# Patient Record
Sex: MALE | Race: WHITE
[De-identification: names, ages, dates, MRNs, and addresses within clinical notes are randomized per-mention and may not be internally consistent; named-entity substitution may affect disease eponyms.]

---

## 2018-05-01 ENCOUNTER — HOSPITAL ENCOUNTER (EMERGENCY)
Dept: HOSPITAL 80 - FED | Age: 73
Discharge: HOME | End: 2018-05-01
Payer: COMMERCIAL

## 2018-05-01 VITALS — DIASTOLIC BLOOD PRESSURE: 107 MMHG | SYSTOLIC BLOOD PRESSURE: 155 MMHG

## 2018-05-01 DIAGNOSIS — E11.9: ICD-10-CM

## 2018-05-01 DIAGNOSIS — Z79.84: ICD-10-CM

## 2018-05-01 DIAGNOSIS — I10: ICD-10-CM

## 2018-05-01 DIAGNOSIS — R55: Primary | ICD-10-CM

## 2018-05-01 LAB — PLATELET # BLD: 297 10^3/UL (ref 150–400)

## 2018-05-01 NOTE — EDPHY
H & P


Stated Complaint: Presyncope


Time Seen by Provider: 05/01/18 15:43


HPI/ROS: 





CHIEF COMPLAINT:  Presyncope





HISTORY OF PRESENT ILLNESS:  The patient presents to the ED after an episode of 

presyncope that occurred an hour and half ago.  [place]  He has been here for the past 3 days.  He has been exercising 

heavily riding his bike without any chest pain or shortness of breath.  The 

patient reportedly went for a fairly hard ride today and towards the end of the 

ride developed an episode of blurry vision and near syncope.  The patient did 

not have chest pain or shortness of breath.  His symptoms lasted approximately 

10-15 minutes then resolved.  The patient does have a history of hypertension, 

borderline diabetes and hyperlipidemia.  He was concerned about the possibility 

of a cardiac event which prompted his visit to the emergency department today.  

The patient is currently asymptomatic.





REVIEW OF SYSTEMS:


A comprehensive 10 point review of systems is otherwise negative aside from 

elements mentioned in the history of present illness.


Source: Patient





- Medical/Surgical History


Hx Asthma: No


Hx Chronic Respiratory Disease: No


Hx Diabetes: Yes


Hx Cardiac Disease: No


Hx Renal Disease: No


Hx Cirrhosis: No


Hx Alcoholism: No


Hx HIV/AIDS: No


Hx Splenectomy or Spleen Trauma: No


Other PMH: htn.  hyperlipidemia





- Social History


Smoking Status: Never smoked





- Physical Exam


Exam: 





General Appearance:  Alert, no distress


Eyes:  Pupils equal and round no pallor or injection


ENT, Mouth:  Mucous membranes moist


Respiratory:  There are no retractions, lungs are clear to auscultation


Cardiovascular:  Regular rate and rhythm


Gastrointestinal:  Abdomen is soft and nontender, no masses, bowel sounds normal


Neurological:  A&O, normal motor function, normal sensory exam, normal cranial 

nerves


Skin:  Warm and dry, no rashes


Musculoskeletal:  Neck is supple nontender


Extremities:  symmetrical, full range of motion








Constitutional: 


 Initial Vital Signs











Temperature (C)  37.0 C   05/01/18 15:36


 


Heart Rate  77   05/01/18 15:36


 


Respiratory Rate  16   05/01/18 15:36


 


Blood Pressure  150/96 H  05/01/18 15:36


 


O2 Sat (%)  97   05/01/18 15:36








 











O2 Delivery Mode               Room Air














Allergies/Adverse Reactions: 


 





No Known Allergies Allergy (Unverified 05/01/18 15:33)


 








Home Medications: 














 Medication  Instructions  Recorded


 


Amlodipine Besylate  05/01/18


 


Irbesartan  05/01/18


 


Metformin HCl  05/01/18


 


Simvastatin  05/01/18














Medical Decision Making





- Diagnostics


EKG Interpretation: 





EKG:  Complete interpretation has been separately recorded in the [facility] 

archive.  Summary impression:  Sinus rhythm, 70, PVC


Imaging Results: 


 Imaging Impressions





Chest X-Ray  05/01/18 15:43


Impression: Mild peribronchial thickening that can be seen with bronchitis or 

mild fluid overload.


 


 











ED Course/Re-evaluation: 





The patient presents the ED after he experienced an episode of presyncope after 

a workout.  The patient has no known history of coronary artery disease.  He 

did not have chest pain or shortness of breath.  He had symptoms that began 

approximately 2 hr prior to arrival.  Patient does have risk factors for 

coronary artery disease which prompted his visit to the emergency department 

today.





The patient arrives and is noted to have a normal EKG.  Initial troponin is 

negative.





The patient was observed in the emergency department.  He did have a repeat 

troponin performed at 5:00 p.m..





I discussed the results with the patient.  At this point time I see no evidence 

of myocardial infarction.  He does understand that we cannot fully exclude 

coronary artery disease.  The patient would like to follow up with an 

outpatient with Cardiology.  He has been given the number of our on-call 

[facility]iology [facility].








Differential Diagnosis: 





Differential diagnosis considered includes acute coronary syndrome, dehydration

, metabolic abnormality, arrhythmia, anemia, renal failure





- Data Points


Laboratory Results: 


 Laboratory Results





 05/01/18 15:46 





 05/01/18 15:46 





 











  05/01/18 05/01/18 05/01/18





  17:15 15:46 15:46


 


WBC      10.41 10^3/uL H 10^3/uL





     (3.80-9.50) 


 


RBC      5.36 10^6/uL 10^6/uL





     (4.40-6.38) 


 


Hgb      15.8 g/dL g/dL





     (13.7-17.5) 


 


Hct      44.8 % %





     (40.0-51.0) 


 


MCV      83.6 fL fL





     (81.5-99.8) 


 


MCH      29.5 pg pg





     (27.9-34.1) 


 


MCHC      35.3 g/dL g/dL





     (32.4-36.7) 


 


RDW      12.9 % %





     (11.5-15.2) 


 


Plt Count      297 10^3/uL 10^3/uL





     (150-400) 


 


MPV      9.6 fL fL





     (8.7-11.7) 


 


Neut % (Auto)      71.9 % %





     (39.3-74.2) 


 


Lymph % (Auto)      17.7 % %





     (15.0-45.0) 


 


Mono % (Auto)      8.3 % %





     (4.5-13.0) 


 


Eos % (Auto)      1.2 % %





     (0.6-7.6) 


 


Baso % (Auto)      0.5 % %





     (0.3-1.7) 


 


Nucleat RBC Rel Count      0.0 % %





     (0.0-0.2) 


 


Absolute Neuts (auto)      7.49 10^3/uL H 10^3/uL





     (1.70-6.50) 


 


Absolute Lymphs (auto)      1.84 10^3/uL 10^3/uL





     (1.00-3.00) 


 


Absolute Monos (auto)      0.86 10^3/uL H 10^3/uL





     (0.30-0.80) 


 


Absolute Eos (auto)      0.13 10^3/uL 10^3/uL





     (0.03-0.40) 


 


Absolute Basos (auto)      0.05 10^3/uL 10^3/uL





     (0.02-0.10) 


 


Absolute Nucleated RBC      0.00 10^3/uL 10^3/uL





     (0-0.01) 


 


Immature Gran %      0.4 % %





     (0.0-1.1) 


 


Immature Gran #      0.04 10^3/uL 10^3/uL





     (0.00-0.10) 


 


Sodium    140 mEq/L mEq/L  





    (135-145)  


 


Potassium    3.9 mEq/L mEq/L  





    (3.5-5.2)  


 


Chloride    100 mEq/L mEq/L  





    ()  


 


Carbon Dioxide    23 mEq/l mEq/l  





    (22-31)  


 


Anion Gap    17 mEq/L H mEq/L  





    (8-16)  


 


BUN    16 mg/dL mg/dL  





    (7-23)  


 


Creatinine    0.9 mg/dL mg/dL  





    (0.7-1.3)  


 


Estimated GFR    > 60   





    


 


Glucose    145 mg/dL H mg/dL  





    ()  


 


Calcium    9.7 mg/dL mg/dL  





    (8.5-10.4)  


 


Troponin I  < 0.012 ng/mL ng/mL  < 0.012 ng/mL ng/mL  





   (0.000-0.034)   (0.000-0.034)  














Departure





- Departure


Disposition: Home, Routine, Self-Care


Clinical Impression: 


 Pre-syncope





Condition: Good


Instructions:  Near Syncope (ED)


Additional Instructions: 


1. Based upon the testing done in the Emergency Department today we see no 

evidence of a heart attack.


2. We are unable to fully exclude coronary artery disease based upon the 

testing available in the Emergency Department.


3. For this reason, we would like you to be seen by cardiology for 

consideration of additional testing within the next 3 days.


4. Please contact the cardiologist you have been referred to schedule this 

appointment as soon as possible. Their offices are typically open from 8:30am-

5pm M-F. 


5. Please return to the Emergency Department immediately for any recurrent 

lightheadedness, chest pain, difficulty breathing or other concerns.











Referrals: 


Ken Swann MD [Medical Doctor] - As per Instructions

## 2018-05-01 NOTE — CPEKG
Heart Rate: 70

RR Interval: 857

P-R Interval: 180

QRSD Interval: 90

QT Interval: 388

QTC Interval: 419

P Axis: 55

QRS Axis: 8

T Wave Axis: 12

EKG Severity - OTHERWISE NORMAL ECG -

EKG Impression: SINUS RHYTHM

EKG Impression: VENTRICULAR PREMATURE COMPLEX

Electronically Signed By: Howard Pete 01-May-2018 16:01:19